# Patient Record
Sex: FEMALE | URBAN - METROPOLITAN AREA
[De-identification: names, ages, dates, MRNs, and addresses within clinical notes are randomized per-mention and may not be internally consistent; named-entity substitution may affect disease eponyms.]

---

## 2017-03-06 ENCOUNTER — APPOINTMENT (OUTPATIENT)
Age: 27
Setting detail: DERMATOLOGY
End: 2017-03-06

## 2017-03-06 VITALS — WEIGHT: 197 LBS | HEIGHT: 61 IN | WEIGHT: 197 LBS | HEIGHT: 61 IN

## 2017-03-06 DIAGNOSIS — L81.4 OTHER MELANIN HYPERPIGMENTATION: ICD-10-CM

## 2017-03-06 DIAGNOSIS — L21.8 OTHER SEBORRHEIC DERMATITIS: ICD-10-CM

## 2017-03-06 DIAGNOSIS — L70.0 ACNE VULGARIS: ICD-10-CM

## 2017-03-06 PROCEDURE — OTHER TREATMENT REGIMEN: OTHER

## 2017-03-06 PROCEDURE — OTHER PRESCRIPTION: OTHER

## 2017-03-06 PROCEDURE — 99202 OFFICE O/P NEW SF 15 MIN: CPT

## 2017-03-06 PROCEDURE — OTHER FOLLOW UP FOR NEXT VISIT: OTHER

## 2017-03-06 PROCEDURE — OTHER COUNSELING: OTHER

## 2017-03-06 RX ORDER — CLINDAMYCIN PHOSPHATE AND TRETINOIN 12; .25 MG/G; MG/G
THIN COAT GEL TOPICAL QHS
Qty: 1 | Refills: 2 | Status: ERX | COMMUNITY
Start: 2017-03-06

## 2017-03-06 ASSESSMENT — LOCATION DETAILED DESCRIPTION DERM
LOCATION DETAILED: INFERIOR MID FOREHEAD
LOCATION DETAILED: INFERIOR MID FOREHEAD
LOCATION DETAILED: RIGHT MEDIAL MALAR CHEEK
LOCATION DETAILED: RIGHT MEDIAL MALAR CHEEK
LOCATION DETAILED: LEFT INFERIOR CENTRAL MALAR CHEEK
LOCATION DETAILED: RIGHT SUPERIOR LATERAL MALAR CHEEK
LOCATION DETAILED: LEFT SUPERIOR CENTRAL MALAR CHEEK
LOCATION DETAILED: LEFT INFERIOR CENTRAL MALAR CHEEK
LOCATION DETAILED: RIGHT SUPERIOR LATERAL MALAR CHEEK
LOCATION DETAILED: LEFT SUPERIOR CENTRAL MALAR CHEEK

## 2017-03-06 ASSESSMENT — SEVERITY ASSESSMENT
SEVERITY: MILD
SEVERITY: MILD
HOW SEVERE IS THIS PATIENT'S CONDITION?: CLEAR
HOW SEVERE IS THIS PATIENT'S CONDITION?: CLEAR

## 2017-03-06 ASSESSMENT — LOCATION SIMPLE DESCRIPTION DERM
LOCATION SIMPLE: INFERIOR FOREHEAD
LOCATION SIMPLE: LEFT CHEEK
LOCATION SIMPLE: LEFT CHEEK
LOCATION SIMPLE: RIGHT CHEEK
LOCATION SIMPLE: RIGHT CHEEK
LOCATION SIMPLE: INFERIOR FOREHEAD

## 2017-03-06 ASSESSMENT — LOCATION ZONE DERM
LOCATION ZONE: FACE
LOCATION ZONE: FACE

## 2017-03-06 ASSESSMENT — SEVERITY ASSESSMENT OVERALL AMONG ALL PATIENTS
IN YOUR EXPERIENCE, AMONG ALL PATIENTS YOU HAVE SEEN WITH THIS CONDITION, HOW SEVERE IS THIS PATIENT'S CONDITION?: FEW INFLAMMATORY LESIONS, SOME NONINFLAMMATORY
IN YOUR EXPERIENCE, AMONG ALL PATIENTS YOU HAVE SEEN WITH THIS CONDITION, HOW SEVERE IS THIS PATIENT'S CONDITION?: FEW INFLAMMATORY LESIONS, SOME NONINFLAMMATORY

## 2017-03-06 NOTE — PROCEDURE: FOLLOW UP FOR NEXT VISIT
Scheduled For Follow Up In (Optional): 2 mos
Detail Level: Zone
Instructions (Optional): Call to check in on progress with patient

## 2017-03-06 NOTE — HPI: PIMPLES (NODULAR ACNE)
How Severe Is Your Nodular Acne?: moderate
Is This A New Presentation, Or A Follow-Up?: Acne
Additional History: Puala worked for pt in the past.
Females Only: When Was Your Last Menstrual Period?: [id number]

## 2017-03-06 NOTE — PROCEDURE: TREATMENT REGIMEN
Detail Level: Zone
Plan: Patient was using Ziana in the past but ran out of the medication. It was working well for her so we will restart the same medication. She may want to hold the benzoyl peroxide wash that she has been using in the morning due to the fact that this could cause increased dryness but she can always add it back as needed
Plan: The Ziana should make a big difference with this issue as well, it may take longer to see results but ongoing use will help
Plan: Currently there is no evidence of any scaling or dryness but she has been using ketoconazole daily for the last one year. She is going to try to back off on the frequency of the application to see if she still needs. If she does need a refill she will call for a prescription of the 2% cream
Detail Level: Simple

## 2017-04-13 ENCOUNTER — RX ONLY (RX ONLY)
Age: 27
End: 2017-04-13

## 2017-04-13 RX ORDER — TRETINOIN 0.5 MG/G
THIN COAT CREAM TOPICAL QHS
Qty: 1 | Refills: 1 | Status: ERX | COMMUNITY
Start: 2017-04-13

## 2018-09-28 ENCOUNTER — ACNE (OUTPATIENT)
Dept: URBAN - METROPOLITAN AREA CLINIC 18 | Facility: CLINIC | Age: 28
Setting detail: DERMATOLOGY
End: 2018-09-28

## 2018-09-28 ENCOUNTER — RX ONLY (RX ONLY)
Age: 28
End: 2018-09-28

## 2018-09-28 DIAGNOSIS — L81.4 OTHER MELANIN HYPERPIGMENTATION: ICD-10-CM

## 2018-09-28 DIAGNOSIS — L82.1 OTHER SEBORRHEIC KERATOSIS: ICD-10-CM

## 2018-09-28 DIAGNOSIS — L85.9 EPIDERMAL THICKENING, UNSPECIFIED: ICD-10-CM

## 2018-09-28 DIAGNOSIS — D22.5 MELANOCYTIC NEVI OF TRUNK: ICD-10-CM

## 2018-09-28 DIAGNOSIS — L57.8 OTHER SKIN CHANGES DUE TO CHRONIC EXPOSURE TO NONIONIZING RADIATION: ICD-10-CM

## 2018-09-28 DIAGNOSIS — D18.01 HEMANGIOMA OF SKIN AND SUBCUTANEOUS TISSUE: ICD-10-CM

## 2018-09-28 PROCEDURE — 99202 OFFICE O/P NEW SF 15 MIN: CPT

## 2018-09-28 RX ORDER — TRETINOIN 0.6 MG/G
1 APPLICATION GEL TOPICAL NIGHTLY
Qty: 50 | Refills: 2
Start: 2018-09-28

## 2018-09-28 RX ORDER — DAPSONE 75 MG/G
1 APPLICATION GEL TOPICAL IN A.M.
Qty: 1 | Refills: 2
Start: 2018-09-28

## 2021-03-15 ENCOUNTER — ACNE (OUTPATIENT)
Dept: URBAN - METROPOLITAN AREA CLINIC 17 | Facility: CLINIC | Age: 31
Setting detail: DERMATOLOGY
End: 2021-03-15

## 2021-03-15 ENCOUNTER — RX ONLY (RX ONLY)
Age: 31
End: 2021-03-15

## 2021-03-15 DIAGNOSIS — L73.9 FOLLICULAR DISORDER, UNSPECIFIED: ICD-10-CM

## 2021-03-15 PROCEDURE — 99214 OFFICE O/P EST MOD 30 MIN: CPT

## 2021-03-15 RX ORDER — TRETINOIN 0.5 MG/G
A SMALL AMOUNT CREAM TOPICAL EVERY EVENING
Qty: 45 | Refills: 3
Start: 2021-03-15

## 2022-05-31 ENCOUNTER — APPOINTMENT (OUTPATIENT)
Dept: URBAN - METROPOLITAN AREA CLINIC 270 | Age: 32
Setting detail: DERMATOLOGY
End: 2022-06-03

## 2022-05-31 DIAGNOSIS — L73.2 HIDRADENITIS SUPPURATIVA: ICD-10-CM

## 2022-05-31 PROCEDURE — OTHER COUNSELING: OTHER

## 2022-05-31 PROCEDURE — OTHER MEDICATION COUNSELING: OTHER

## 2022-05-31 PROCEDURE — OTHER MIPS QUALITY: OTHER

## 2022-05-31 PROCEDURE — OTHER PRESCRIPTION: OTHER

## 2022-05-31 PROCEDURE — 99203 OFFICE O/P NEW LOW 30 MIN: CPT

## 2022-05-31 PROCEDURE — OTHER REASSURANCE: OTHER

## 2022-05-31 RX ORDER — CLINDAMYCIN PHOSPHATE 10 MG/ML
SOLUTION TOPICAL
Qty: 1 | Refills: 2 | Status: ERX | COMMUNITY
Start: 2022-05-31

## 2022-05-31 RX ORDER — CHLORHEXIDINE GLUCONATE 213 G/1000ML
SOLUTION TOPICAL
Qty: 3785.41 | Refills: 2 | Status: ERX | COMMUNITY
Start: 2022-05-31

## 2022-05-31 RX ORDER — DOXYCYCLINE HYCLATE 100 MG/1
CAPSULE, GELATIN COATED ORAL
Qty: 30 | Refills: 2 | Status: ERX | COMMUNITY
Start: 2022-05-31

## 2022-05-31 ASSESSMENT — LOCATION DETAILED DESCRIPTION DERM
LOCATION DETAILED: XIPHOID
LOCATION DETAILED: RIGHT AXILLARY VAULT
LOCATION DETAILED: LEFT AXILLARY VAULT

## 2022-05-31 ASSESSMENT — LOCATION ZONE DERM
LOCATION ZONE: TRUNK
LOCATION ZONE: AXILLAE

## 2022-05-31 ASSESSMENT — LOCATION SIMPLE DESCRIPTION DERM
LOCATION SIMPLE: ABDOMEN
LOCATION SIMPLE: RIGHT AXILLARY VAULT
LOCATION SIMPLE: LEFT AXILLARY VAULT

## 2022-05-31 NOTE — PROCEDURE: MEDICATION COUNSELING
99 Simponi Counseling:  I discussed with the patient the risks of golimumab including but not limited to myelosuppression, immunosuppression, autoimmune hepatitis, demyelinating diseases, lymphoma, and serious infections.  The patient understands that monitoring is required including a PPD at baseline and must alert us or the primary physician if symptoms of infection or other concerning signs are noted.

## 2022-09-16 ENCOUNTER — APPOINTMENT (OUTPATIENT)
Dept: URBAN - METROPOLITAN AREA CLINIC 270 | Age: 32
Setting detail: DERMATOLOGY
End: 2022-09-20

## 2022-09-16 DIAGNOSIS — L29.8 OTHER PRURITUS: ICD-10-CM

## 2022-09-16 DIAGNOSIS — L73.2 HIDRADENITIS SUPPURATIVA: ICD-10-CM

## 2022-09-16 PROCEDURE — OTHER ADDITIONAL NOTES: OTHER

## 2022-09-16 PROCEDURE — OTHER COUNSELING: OTHER

## 2022-09-16 PROCEDURE — OTHER INTRALESIONAL KENALOG: OTHER

## 2022-09-16 PROCEDURE — 99213 OFFICE O/P EST LOW 20 MIN: CPT | Mod: 25

## 2022-09-16 PROCEDURE — OTHER SEPARATE AND IDENTIFIABLE DOCUMENTATION: OTHER

## 2022-09-16 PROCEDURE — OTHER MIPS QUALITY: OTHER

## 2022-09-16 PROCEDURE — OTHER MEDICATION COUNSELING: OTHER

## 2022-09-16 PROCEDURE — 11900 INJECT SKIN LESIONS </W 7: CPT

## 2022-09-16 PROCEDURE — OTHER REASSURANCE: OTHER

## 2022-09-16 ASSESSMENT — LOCATION DETAILED DESCRIPTION DERM
LOCATION DETAILED: LEFT RIB CAGE
LOCATION DETAILED: RIGHT AXILLARY VAULT
LOCATION DETAILED: XIPHOID
LOCATION DETAILED: LEFT AXILLARY VAULT
LOCATION DETAILED: LEFT AXILLARY TAIL OF BREAST

## 2022-09-16 ASSESSMENT — LOCATION ZONE DERM
LOCATION ZONE: TRUNK
LOCATION ZONE: AXILLAE

## 2022-09-16 ASSESSMENT — LOCATION SIMPLE DESCRIPTION DERM
LOCATION SIMPLE: LEFT AXILLARY VAULT
LOCATION SIMPLE: LEFT BREAST
LOCATION SIMPLE: ABDOMEN
LOCATION SIMPLE: RIGHT AXILLARY VAULT

## 2022-09-16 ASSESSMENT — HURLEY STAGE
IN YOUR EXPERIENCE, AMONG ALL PATIENTS YOU HAVE SEEN WITH THIS CONDITION, HOW SEVERE IS THIS PATIENT'S CONDITION?: HURLEY STAGE I: ABSCESS FORMATION (SINGLE OR MULTIPLE) WITHOUT SINUS TRACTS OR SCARRING

## 2022-09-16 NOTE — PROCEDURE: MEDICATION COUNSELING
[No Acute Distress] : no acute distress [Well Nourished] : well nourished Valtrex Pregnancy And Lactation Text: this medication is Pregnancy Category B and is considered safe during pregnancy. This medication is not directly found in breast milk but it's metabolite acyclovir is present. [Well Developed] : well developed [Well-Appearing] : well-appearing [Alert and Oriented x3] : oriented to person, place, and time [Normal Appearance] : was normal in appearance [Neck Supple] : was supple [No Respiratory Distress] : no respiratory distress  [Clear to Auscultation] : lungs were clear to auscultation bilaterally [Normal Rhythm/Effort] : normal respiratory rhythm and effort [Normal Rate] : normal rate  [Regular Rhythm] : with a regular rhythm [Normal S1, S2] : normal S1 and S2 [No Murmur] : no murmur heard [No Edema] : there was no peripheral edema [Soft] : abdomen soft [Non Tender] : non-tender [de-identified] : bite shaina abdomen with slight redness, no surrounding rash

## 2022-09-16 NOTE — PROCEDURE: MEDICATION COUNSELING
Prior to immunization administration, verified patients identity using patient s name and date of birth. Please see Immunization Activity for additional information.     Screening Questionnaire for Pediatric Immunization    Is the child sick today?   No   Does the child have allergies to medications, food, a vaccine component, or latex?   No   Has the child had a serious reaction to a vaccine in the past?   No   Does the child have a long-term health problem with lung, heart, kidney or metabolic disease (e.g., diabetes), asthma, a blood disorder, no spleen, complement component deficiency, a cochlear implant, or a spinal fluid leak?  Is he/she on long-term aspirin therapy?   No   If the child to be vaccinated is 2 through 4 years of age, has a healthcare provider told you that the child had wheezing or asthma in the  past 12 months?   No   If your child is a baby, have you ever been told he or she has had intussusception?   No   Has the child, sibling or parent had a seizure, has the child had brain or other nervous system problems?   No   Does the child have cancer, leukemia, AIDS, or any immune system         problem?   No   Does the child have a parent, brother, or sister with an immune system problem?   No   In the past 3 months, has the child taken medications that affect the immune system such as prednisone, other steroids, or anticancer drugs; drugs for the treatment of rheumatoid arthritis, Crohn s disease, or psoriasis; or had radiation treatments?   Yes   In the past year, has the child received a transfusion of blood or blood products, or been given immune (gamma) globulin or an antiviral drug?   No   Is the child/teen pregnant or is there a chance that she could become       pregnant during the next month?   No   Has the child received any vaccinations in the past 4 weeks?   No      Immunization questionnaire answers were all negative.          Per orders of Dr. Stoll, injection of Dtap-IPV and Influeza  given by Lola Chou MA. Patient instructed to remain in clinic for 15 minutes afterwards, and to report any adverse reaction to me immediately.    Screening performed by Lola Chou MA on 9/7/2022 at 9:18 AM.   Enbrel Counseling:  I discussed with the patient the risks of etanercept including but not limited to myelosuppression, immunosuppression, autoimmune hepatitis, demyelinating diseases, lymphoma, and infections.  The patient understands that monitoring is required including a PPD at baseline and must alert us or the primary physician if symptoms of infection or other concerning signs are noted.

## 2022-09-16 NOTE — PROCEDURE: ADDITIONAL NOTES
Patient Management Risk Assessment: Moderate
Render Risk Assessment In Note?: yes
Additional Notes: Spoke about potentially starting humira if not improved. \\nRecommended Laser hair removal. \\nR axilla improved today.
Detail Level: Simple

## 2022-09-16 NOTE — PROCEDURE: MEDICATION COUNSELING
Cyclosporine Pregnancy And Lactation Text: This medication is Pregnancy Category C and it isn't know if it is safe during pregnancy. This medication is excreted in breast milk. Normal rate, regular rhythm.  Heart sounds S1, S2.  No murmurs, rubs or gallops.

## 2022-10-14 ENCOUNTER — APPOINTMENT (OUTPATIENT)
Dept: URBAN - METROPOLITAN AREA CLINIC 270 | Age: 32
Setting detail: DERMATOLOGY
End: 2022-10-19

## 2022-10-14 ENCOUNTER — RX ONLY (RX ONLY)
Age: 32
End: 2022-10-14

## 2022-10-14 DIAGNOSIS — L29.8 OTHER PRURITUS: ICD-10-CM

## 2022-10-14 DIAGNOSIS — L73.2 HIDRADENITIS SUPPURATIVA: ICD-10-CM

## 2022-10-14 DIAGNOSIS — L70.0 ACNE VULGARIS: ICD-10-CM

## 2022-10-14 PROCEDURE — OTHER ADDITIONAL NOTES: OTHER

## 2022-10-14 PROCEDURE — OTHER COUNSELING: OTHER

## 2022-10-14 PROCEDURE — OTHER INTRALESIONAL KENALOG: OTHER

## 2022-10-14 PROCEDURE — OTHER SEPARATE AND IDENTIFIABLE DOCUMENTATION: OTHER

## 2022-10-14 PROCEDURE — OTHER MEDICATION COUNSELING: OTHER

## 2022-10-14 PROCEDURE — 11900 INJECT SKIN LESIONS </W 7: CPT

## 2022-10-14 PROCEDURE — OTHER PRESCRIPTION: OTHER

## 2022-10-14 PROCEDURE — OTHER REASSURANCE: OTHER

## 2022-10-14 PROCEDURE — 99214 OFFICE O/P EST MOD 30 MIN: CPT | Mod: 25

## 2022-10-14 RX ORDER — TRETIONIN 0.5 MG/G
CREAM TOPICAL
Qty: 45 | Refills: 1 | Status: ERX

## 2022-10-14 RX ORDER — TRETIONIN 0.5 MG/G
CREAM TOPICAL
Qty: 45 | Refills: 1 | Status: ERX | COMMUNITY
Start: 2022-10-14

## 2022-10-14 RX ORDER — CLINDAMYCIN PHOSPHATE 10 MG/ML
SOLUTION TOPICAL
Qty: 120 | Refills: 4 | Status: ERX

## 2022-10-14 RX ORDER — CLINDAMYCIN PHOSPHATE 10 MG/ML
SOLUTION TOPICAL
Qty: 120 | Refills: 4 | Status: ERX | COMMUNITY
Start: 2022-10-14

## 2022-10-14 ASSESSMENT — LOCATION DETAILED DESCRIPTION DERM
LOCATION DETAILED: LEFT AXILLARY VAULT
LOCATION DETAILED: RIGHT INFERIOR CENTRAL MALAR CHEEK
LOCATION DETAILED: LEFT INFERIOR CENTRAL MALAR CHEEK
LOCATION DETAILED: RIGHT AXILLARY VAULT
LOCATION DETAILED: LEFT RIB CAGE
LOCATION DETAILED: XIPHOID
LOCATION DETAILED: LEFT AXILLARY TAIL OF BREAST
LOCATION DETAILED: INFERIOR MID FOREHEAD

## 2022-10-14 ASSESSMENT — LOCATION SIMPLE DESCRIPTION DERM
LOCATION SIMPLE: RIGHT AXILLARY VAULT
LOCATION SIMPLE: LEFT AXILLARY VAULT
LOCATION SIMPLE: LEFT BREAST
LOCATION SIMPLE: INFERIOR FOREHEAD
LOCATION SIMPLE: RIGHT CHEEK
LOCATION SIMPLE: ABDOMEN
LOCATION SIMPLE: LEFT CHEEK

## 2022-10-14 ASSESSMENT — LOCATION ZONE DERM
LOCATION ZONE: FACE
LOCATION ZONE: AXILLAE
LOCATION ZONE: TRUNK

## 2022-10-14 NOTE — PROCEDURE: ADDITIONAL NOTES
Detail Level: Simple
Render Risk Assessment In Note?: yes
Additional Notes: Spoke about potentially starting humira if not improved. \\nRecommended Laser hair removal. \\nR axilla improved today.
Patient Management Risk Assessment: Moderate

## 2022-10-14 NOTE — PROCEDURE: COUNSELING
Doxycycline Pregnancy And Lactation Text: This medication is Pregnancy Category D and not consider safe during pregnancy. It is also excreted in breast milk but is considered safe for shorter treatment courses.
High Dose Vitamin A Pregnancy And Lactation Text: High dose vitamin A therapy is contraindicated during pregnancy and breast feeding.
Topical Retinoid counseling:  Patient advised to apply a pea-sized amount only at bedtime and wait 30 minutes after washing their face before applying.  If too drying, patient may add a non-comedogenic moisturizer. The patient verbalized understanding of the proper use and possible adverse effects of retinoids.  All of the patient's questions and concerns were addressed.
Bactrim Pregnancy And Lactation Text: This medication is Pregnancy Category D and is known to cause fetal risk.  It is also excreted in breast milk.
Detail Level: Detailed
Aklief counseling:  Patient advised to apply a pea-sized amount only at bedtime and wait 30 minutes after washing their face before applying.  If too drying, patient may add a non-comedogenic moisturizer.  The most commonly reported side effects including irritation, redness, scaling, dryness, stinging, burning, itching, and increased risk of sunburn.  The patient verbalized understanding of the proper use and possible adverse effects of retinoids.  All of the patient's questions and concerns were addressed.
Topical Sulfur Applications Counseling: Topical Sulfur Counseling: Patient counseled that this medication may cause skin irritation or allergic reactions.  In the event of skin irritation, the patient was advised to reduce the amount of the drug applied or use it less frequently.   The patient verbalized understanding of the proper use and possible adverse effects of topical sulfur application.  All of the patient's questions and concerns were addressed.
Isotretinoin Pregnancy And Lactation Text: This medication is Pregnancy Category X and is considered extremely dangerous during pregnancy. It is unknown if it is excreted in breast milk.
Include Pregnancy/Lactation Warning?: No
Azithromycin Counseling:  I discussed with the patient the risks of azithromycin including but not limited to GI upset, allergic reaction, drug rash, diarrhea, and yeast infections.
Winlevi Counseling:  I discussed with the patient the risks of topical clascoterone including but not limited to erythema, scaling, itching, and stinging. Patient voiced their understanding.
Dapsone Pregnancy And Lactation Text: This medication is Pregnancy Category C and is not considered safe during pregnancy or breast feeding.
Sarecycline Pregnancy And Lactation Text: This medication is Pregnancy Category D and not consider safe during pregnancy. It is also excreted in breast milk.
Benzoyl Peroxide Pregnancy And Lactation Text: This medication is Pregnancy Category C. It is unknown if benzoyl peroxide is excreted in breast milk.
Isotretinoin Counseling: Patient should get monthly blood tests, not donate blood, not drive at night if vision affected, not share medication, and not undergo elective surgery for 6 months after tx completed. Side effects reviewed, pt to contact office should one occur.
High Dose Vitamin A Counseling: Side effects reviewed, pt to contact office should one occur.
Erythromycin Counseling:  I discussed with the patient the risks of erythromycin including but not limited to GI upset, allergic reaction, drug rash, diarrhea, increase in liver enzymes, and yeast infections.
Tazorac Pregnancy And Lactation Text: This medication is not safe during pregnancy. It is unknown if this medication is excreted in breast milk.
Benzoyl Peroxide Counseling: Patient counseled that medicine may cause skin irritation and bleach clothing.  In the event of skin irritation, the patient was advised to reduce the amount of the drug applied or use it less frequently.   The patient verbalized understanding of the proper use and possible adverse effects of benzoyl peroxide.  All of the patient's questions and concerns were addressed.
Spironolactone Pregnancy And Lactation Text: This medication can cause feminization of the male fetus and should be avoided during pregnancy. The active metabolite is also found in breast milk.
Topical Retinoid Pregnancy And Lactation Text: This medication is Pregnancy Category C. It is unknown if this medication is excreted in breast milk.
Birth Control Pills Counseling: Birth Control Pill Counseling: I discussed with the patient the potential side effects of OCPs including but not limited to increased risk of stroke, heart attack, thrombophlebitis, deep venous thrombosis, hepatic adenomas, breast changes, GI upset, headaches, and depression.  The patient verbalized understanding of the proper use and possible adverse effects of OCPs. All of the patient's questions and concerns were addressed.
Bactrim Counseling:  I discussed with the patient the risks of sulfa antibiotics including but not limited to GI upset, allergic reaction, drug rash, diarrhea, dizziness, photosensitivity, and yeast infections.  Rarely, more serious reactions can occur including but not limited to aplastic anemia, agranulocytosis, methemoglobinemia, blood dyscrasias, liver or kidney failure, lung infiltrates or desquamative/blistering drug rashes.
Topical Sulfur Applications Pregnancy And Lactation Text: This medication is Pregnancy Category C and has an unknown safety profile during pregnancy. It is unknown if this topical medication is excreted in breast milk.
Topical Clindamycin Pregnancy And Lactation Text: This medication is Pregnancy Category B and is considered safe during pregnancy. It is unknown if it is excreted in breast milk.
Minocycline Counseling: Patient advised regarding possible photosensitivity and discoloration of the teeth, skin, lips, tongue and gums.  Patient instructed to avoid sunlight, if possible.  When exposed to sunlight, patients should wear protective clothing, sunglasses, and sunscreen.  The patient was instructed to call the office immediately if the following severe adverse effects occur:  hearing changes, easy bruising/bleeding, severe headache, or vision changes.  The patient verbalized understanding of the proper use and possible adverse effects of minocycline.  All of the patient's questions and concerns were addressed.
Winlevi Pregnancy And Lactation Text: This medication is considered safe during pregnancy and breastfeeding.
Dapsone Counseling: I discussed with the patient the risks of dapsone including but not limited to hemolytic anemia, agranulocytosis, rashes, methemoglobinemia, kidney failure, peripheral neuropathy, headaches, GI upset, and liver toxicity.  Patients who start dapsone require monitoring including baseline LFTs and weekly CBCs for the first month, then every month thereafter.  The patient verbalized understanding of the proper use and possible adverse effects of dapsone.  All of the patient's questions and concerns were addressed.
Sarecycline Counseling: Patient advised regarding possible photosensitivity and discoloration of the teeth, skin, lips, tongue and gums.  Patient instructed to avoid sunlight, if possible.  When exposed to sunlight, patients should wear protective clothing, sunglasses, and sunscreen.  The patient was instructed to call the office immediately if the following severe adverse effects occur:  hearing changes, easy bruising/bleeding, severe headache, or vision changes.  The patient verbalized understanding of the proper use and possible adverse effects of sarecycline.  All of the patient's questions and concerns were addressed.
Tetracycline Counseling: Patient counseled regarding possible photosensitivity and increased risk for sunburn.  Patient instructed to avoid sunlight, if possible.  When exposed to sunlight, patients should wear protective clothing, sunglasses, and sunscreen.  The patient was instructed to call the office immediately if the following severe adverse effects occur:  hearing changes, easy bruising/bleeding, severe headache, or vision changes.  The patient verbalized understanding of the proper use and possible adverse effects of tetracycline.  All of the patient's questions and concerns were addressed. Patient understands to avoid pregnancy while on therapy due to potential birth defects.
Topical Clindamycin Counseling: Patient counseled that this medication may cause skin irritation or allergic reactions.  In the event of skin irritation, the patient was advised to reduce the amount of the drug applied or use it less frequently.   The patient verbalized understanding of the proper use and possible adverse effects of clindamycin.  All of the patient's questions and concerns were addressed.
Azelaic Acid Pregnancy And Lactation Text: This medication is considered safe during pregnancy and breast feeding.
Spironolactone Counseling: Patient advised regarding risks of diarrhea, abdominal pain, hyperkalemia, birth defects (for female patients), liver toxicity and renal toxicity. The patient may need blood work to monitor liver and kidney function and potassium levels while on therapy. The patient verbalized understanding of the proper use and possible adverse effects of spironolactone.  All of the patient's questions and concerns were addressed.
Tazorac Counseling:  Patient advised that medication is irritating and drying.  Patient may need to apply sparingly and wash off after an hour before eventually leaving it on overnight.  The patient verbalized understanding of the proper use and possible adverse effects of tazorac.  All of the patient's questions and concerns were addressed.
Aklief Pregnancy And Lactation Text: It is unknown if this medication is safe to use during pregnancy.  It is unknown if this medication is excreted in breast milk.  Breastfeeding women should use the topical cream on the smallest area of the skin for the shortest time needed while breastfeeding.  Do not apply to nipple and areola.
Birth Control Pills Pregnancy And Lactation Text: This medication should be avoided if pregnant and for the first 30 days post-partum.
Doxycycline Counseling:  Patient counseled regarding possible photosensitivity and increased risk for sunburn.  Patient instructed to avoid sunlight, if possible.  When exposed to sunlight, patients should wear protective clothing, sunglasses, and sunscreen.  The patient was instructed to call the office immediately if the following severe adverse effects occur:  hearing changes, easy bruising/bleeding, severe headache, or vision changes.  The patient verbalized understanding of the proper use and possible adverse effects of doxycycline.  All of the patient's questions and concerns were addressed.
Erythromycin Pregnancy And Lactation Text: This medication is Pregnancy Category B and is considered safe during pregnancy. It is also excreted in breast milk.
Azelaic Acid Counseling: Patient counseled that medicine may cause skin irritation and to avoid applying near the eyes.  In the event of skin irritation, the patient was advised to reduce the amount of the drug applied or use it less frequently.   The patient verbalized understanding of the proper use and possible adverse effects of azelaic acid.  All of the patient's questions and concerns were addressed.
Azithromycin Pregnancy And Lactation Text: This medication is considered safe during pregnancy and is also secreted in breast milk.

## 2022-11-23 ENCOUNTER — APPOINTMENT (OUTPATIENT)
Dept: URBAN - METROPOLITAN AREA CLINIC 270 | Age: 32
Setting detail: DERMATOLOGY
End: 2022-11-24

## 2022-11-23 DIAGNOSIS — L29.8 OTHER PRURITUS: ICD-10-CM

## 2022-11-23 DIAGNOSIS — L73.2 HIDRADENITIS SUPPURATIVA: ICD-10-CM

## 2022-11-23 PROCEDURE — OTHER COUNSELING: OTHER

## 2022-11-23 PROCEDURE — OTHER REASSURANCE: OTHER

## 2022-11-23 PROCEDURE — OTHER INTRALESIONAL KENALOG: OTHER

## 2022-11-23 PROCEDURE — OTHER ADDITIONAL NOTES: OTHER

## 2022-11-23 PROCEDURE — OTHER MEDICATION COUNSELING: OTHER

## 2022-11-23 PROCEDURE — OTHER SEPARATE AND IDENTIFIABLE DOCUMENTATION: OTHER

## 2022-11-23 PROCEDURE — 11900 INJECT SKIN LESIONS </W 7: CPT

## 2022-11-23 PROCEDURE — OTHER PRESCRIPTION: OTHER

## 2022-11-23 PROCEDURE — 99213 OFFICE O/P EST LOW 20 MIN: CPT | Mod: 25

## 2022-11-23 RX ORDER — DOXYCYCLINE HYCLATE 100 MG/1
CAPSULE, GELATIN COATED ORAL
Qty: 30 | Refills: 3 | Status: ERX

## 2022-11-23 ASSESSMENT — LOCATION SIMPLE DESCRIPTION DERM
LOCATION SIMPLE: LEFT BREAST
LOCATION SIMPLE: ABDOMEN
LOCATION SIMPLE: LEFT AXILLARY VAULT

## 2022-11-23 ASSESSMENT — LOCATION ZONE DERM
LOCATION ZONE: AXILLAE
LOCATION ZONE: TRUNK

## 2022-11-23 ASSESSMENT — LOCATION DETAILED DESCRIPTION DERM
LOCATION DETAILED: LEFT RIB CAGE
LOCATION DETAILED: LEFT AXILLARY VAULT
LOCATION DETAILED: LEFT AXILLARY TAIL OF BREAST

## 2022-11-23 NOTE — PROCEDURE: MEDICATION COUNSELING
Continue: prednisolone acetate (prednisolone acetate): drops,suspension: 1% 1 drop three times a day as directed into left eye 06- Imiquimod Counseling:  I discussed with the patient the risks of imiquimod including but not limited to erythema, scaling, itching, weeping, crusting, and pain.  Patient understands that the inflammatory response to imiquimod is variable from person to person and was educated regarded proper titration schedule.  If flu-like symptoms develop, patient knows to discontinue the medication and contact us.

## 2022-11-23 NOTE — PROCEDURE: ADDITIONAL NOTES
Patient Management Risk Assessment: Moderate
Additional Notes: Spoke about potentially starting humira if not improved. \\nRecommended Laser hair removal. \\nR axilla improved today.
Detail Level: Simple
Render Risk Assessment In Note?: yes

## 2022-12-20 ENCOUNTER — APPOINTMENT (OUTPATIENT)
Dept: URBAN - METROPOLITAN AREA CLINIC 270 | Age: 32
Setting detail: DERMATOLOGY
End: 2022-12-20

## 2022-12-20 DIAGNOSIS — L73.2 HIDRADENITIS SUPPURATIVA: ICD-10-CM

## 2022-12-20 DIAGNOSIS — L29.8 OTHER PRURITUS: ICD-10-CM

## 2022-12-20 PROCEDURE — OTHER MEDICATION COUNSELING: OTHER

## 2022-12-20 PROCEDURE — OTHER SEPARATE AND IDENTIFIABLE DOCUMENTATION: OTHER

## 2022-12-20 PROCEDURE — OTHER REASSURANCE: OTHER

## 2022-12-20 PROCEDURE — 11900 INJECT SKIN LESIONS </W 7: CPT

## 2022-12-20 PROCEDURE — 99213 OFFICE O/P EST LOW 20 MIN: CPT | Mod: 25

## 2022-12-20 PROCEDURE — OTHER INTRALESIONAL KENALOG: OTHER

## 2022-12-20 PROCEDURE — OTHER ADDITIONAL NOTES: OTHER

## 2022-12-20 PROCEDURE — OTHER COUNSELING: OTHER

## 2022-12-20 PROCEDURE — OTHER PRESCRIPTION MEDICATION MANAGEMENT: OTHER

## 2022-12-20 ASSESSMENT — LOCATION DETAILED DESCRIPTION DERM
LOCATION DETAILED: LEFT AXILLARY VAULT
LOCATION DETAILED: LEFT POSTERIOR AXILLA

## 2022-12-20 ASSESSMENT — LOCATION SIMPLE DESCRIPTION DERM
LOCATION SIMPLE: LEFT AXILLARY VAULT
LOCATION SIMPLE: LEFT POSTERIOR AXILLA

## 2022-12-20 ASSESSMENT — LOCATION ZONE DERM: LOCATION ZONE: AXILLAE

## 2022-12-20 NOTE — PROCEDURE: MEDICATION COUNSELING
walking/standing Humira Counseling:  I discussed with the patient the risks of adalimumab including but not limited to myelosuppression, immunosuppression, autoimmune hepatitis, demyelinating diseases, lymphoma, and serious infections.  The patient understands that monitoring is required including a PPD at baseline and must alert us or the primary physician if symptoms of infection or other concerning signs are noted.

## 2022-12-20 NOTE — PROCEDURE: PRESCRIPTION MEDICATION MANAGEMENT
Render In Strict Bullet Format?: No
Detail Level: Zone
Continue Regimen: doxycycline hyclate 100 mg capsule

## 2022-12-20 NOTE — PROCEDURE: ADDITIONAL NOTES
Detail Level: Simple
Patient Management Risk Assessment: Moderate
Render Risk Assessment In Note?: yes
Additional Notes: Spoke about potentially starting humira if not improved. \\nRecommended Laser hair removal. \\nR axilla improved today.
Additional Notes: Will call if refills are needed

## 2023-01-17 ENCOUNTER — APPOINTMENT (OUTPATIENT)
Dept: URBAN - METROPOLITAN AREA CLINIC 270 | Age: 33
Setting detail: DERMATOLOGY
End: 2023-01-17

## 2023-01-17 DIAGNOSIS — L29.8 OTHER PRURITUS: ICD-10-CM

## 2023-01-17 DIAGNOSIS — L73.2 HIDRADENITIS SUPPURATIVA: ICD-10-CM

## 2023-01-17 PROCEDURE — 99213 OFFICE O/P EST LOW 20 MIN: CPT | Mod: 25

## 2023-01-17 PROCEDURE — OTHER MEDICATION COUNSELING: OTHER

## 2023-01-17 PROCEDURE — OTHER ADDITIONAL NOTES: OTHER

## 2023-01-17 PROCEDURE — OTHER SEPARATE AND IDENTIFIABLE DOCUMENTATION: OTHER

## 2023-01-17 PROCEDURE — OTHER PRESCRIPTION MEDICATION MANAGEMENT: OTHER

## 2023-01-17 PROCEDURE — 11900 INJECT SKIN LESIONS </W 7: CPT

## 2023-01-17 PROCEDURE — OTHER COUNSELING: OTHER

## 2023-01-17 PROCEDURE — OTHER INTRALESIONAL KENALOG: OTHER

## 2023-01-17 ASSESSMENT — LOCATION ZONE DERM: LOCATION ZONE: AXILLAE

## 2023-01-17 ASSESSMENT — LOCATION DETAILED DESCRIPTION DERM
LOCATION DETAILED: LEFT POSTERIOR AXILLA
LOCATION DETAILED: LEFT AXILLARY VAULT

## 2023-01-17 ASSESSMENT — LOCATION SIMPLE DESCRIPTION DERM
LOCATION SIMPLE: LEFT AXILLARY VAULT
LOCATION SIMPLE: LEFT POSTERIOR AXILLA

## 2023-01-17 NOTE — PROCEDURE: ADDITIONAL NOTES
Additional Notes: Referring today for consultation with Dr. Eduardo Valentine in [place] for sinus tract removal. Additional Notes: Referring today for consultation with Dr. Eduardo Valentine in [place] for sinus tract removal.

## 2023-01-17 NOTE — PROCEDURE: MEDICATION COUNSELING
regular Zyclara Pregnancy And Lactation Text: This medication is Pregnancy Category C. It is unknown if this medication is excreted in breast milk.

## 2023-02-28 ENCOUNTER — APPOINTMENT (OUTPATIENT)
Dept: URBAN - METROPOLITAN AREA CLINIC 270 | Age: 33
Setting detail: DERMATOLOGY
End: 2023-03-02

## 2023-02-28 DIAGNOSIS — L73.2 HIDRADENITIS SUPPURATIVA: ICD-10-CM

## 2023-02-28 PROCEDURE — OTHER PRESCRIPTION MEDICATION MANAGEMENT: OTHER

## 2023-02-28 PROCEDURE — 11900 INJECT SKIN LESIONS </W 7: CPT

## 2023-02-28 PROCEDURE — OTHER ADDITIONAL NOTES: OTHER

## 2023-02-28 PROCEDURE — OTHER COUNSELING: OTHER

## 2023-02-28 PROCEDURE — OTHER INTRALESIONAL KENALOG: OTHER

## 2023-02-28 ASSESSMENT — LOCATION ZONE DERM
LOCATION ZONE: AXILLAE
LOCATION ZONE: TRUNK

## 2023-02-28 ASSESSMENT — LOCATION DETAILED DESCRIPTION DERM
LOCATION DETAILED: LEFT AXILLARY VAULT
LOCATION DETAILED: LEFT MEDIAL SUPERIOR CHEST

## 2023-02-28 ASSESSMENT — LOCATION SIMPLE DESCRIPTION DERM
LOCATION SIMPLE: CHEST
LOCATION SIMPLE: LEFT AXILLARY VAULT

## 2023-02-28 NOTE — PROCEDURE: COUNSELING
Patient Specific Counseling (Will Not Stick From Patient To Patient): Discussed other medications that patient could consider in future if worsening, but she feels overall well controlled with current regimen.
Detail Level: Detailed

## 2023-02-28 NOTE — PROCEDURE: ADDITIONAL NOTES
Additional Notes: Pt plans to soon follow up with Dr. Eduardo Valentine in [place] for sinus tract removal. Additional Notes: Pt plans to soon follow up with Dr. Eduardo Valentine in [place] for sinus tract removal.

## 2023-02-28 NOTE — PROCEDURE: PRESCRIPTION MEDICATION MANAGEMENT
Continue Regimen: doxycycline hyclate 100 mg capsule
Render In Strict Bullet Format?: No
Detail Level: Zone

## 2023-05-10 ENCOUNTER — RX ONLY (RX ONLY)
Age: 33
End: 2023-05-10

## 2023-05-10 ENCOUNTER — APPOINTMENT (OUTPATIENT)
Dept: URBAN - METROPOLITAN AREA CLINIC 270 | Age: 33
Setting detail: DERMATOLOGY
End: 2023-05-11

## 2023-05-10 DIAGNOSIS — L73.2 HIDRADENITIS SUPPURATIVA: ICD-10-CM

## 2023-05-10 DIAGNOSIS — L29.8 OTHER PRURITUS: ICD-10-CM

## 2023-05-10 PROCEDURE — OTHER INTRALESIONAL KENALOG: OTHER

## 2023-05-10 PROCEDURE — OTHER COUNSELING: OTHER

## 2023-05-10 PROCEDURE — OTHER ADDITIONAL NOTES: OTHER

## 2023-05-10 PROCEDURE — OTHER BLEACH BATH INSTRUCTIONS: OTHER

## 2023-05-10 PROCEDURE — OTHER PRESCRIPTION MEDICATION MANAGEMENT: OTHER

## 2023-05-10 PROCEDURE — 99213 OFFICE O/P EST LOW 20 MIN: CPT | Mod: 25

## 2023-05-10 PROCEDURE — 11900 INJECT SKIN LESIONS </W 7: CPT

## 2023-05-10 RX ORDER — CHLORHEXIDINE GLUCONATE 213 G/1000ML
SOLUTION TOPICAL
Qty: 3785.41 | Refills: 2 | Status: ERX

## 2023-05-10 ASSESSMENT — HURLEY STAGE
IN YOUR EXPERIENCE, AMONG ALL PATIENTS YOU HAVE SEEN WITH THIS CONDITION, HOW SEVERE IS THIS PATIENT'S CONDITION?: HURLEY STAGE II: SINGLE OR MULTIPLE, WIDELY SEPARATED RECURRENT ABSCESSES WITH SINUS TRACT FORMATION AND SCARRING

## 2023-05-10 ASSESSMENT — LOCATION ZONE DERM: LOCATION ZONE: AXILLAE

## 2023-05-10 ASSESSMENT — LOCATION DETAILED DESCRIPTION DERM
LOCATION DETAILED: LEFT AXILLARY VAULT
LOCATION DETAILED: LEFT POSTERIOR AXILLA

## 2023-05-10 ASSESSMENT — LOCATION SIMPLE DESCRIPTION DERM
LOCATION SIMPLE: LEFT POSTERIOR AXILLA
LOCATION SIMPLE: LEFT AXILLARY VAULT

## 2023-05-10 NOTE — PROCEDURE: PRESCRIPTION MEDICATION MANAGEMENT
Detail Level: Zone
Continue Regimen: doxycycline hyclate 100 mg capsule\\nHibiclens wash
Render In Strict Bullet Format?: No

## 2023-05-10 NOTE — PROCEDURE: ADDITIONAL NOTES
Detail Level: Simple
Additional Notes: Spoke about systemic treatment options as well as laser hair removal at this time patient reports that injections are keeping area stable
Patient Management Risk Assessment: Low
Render Risk Assessment In Note?: yes

## 2023-06-02 NOTE — PROCEDURE: MEDICATION COUNSELING
H&P UPDATE NOTE    Ondina Sampson returns to clinic today for scheduled Procedure. Patient was evaluated in the exam room prior to being brought back to the procedure room. There were no changes in symptoms or clinical condition since the prior visit. All appropriate documentation and studies were reviewed. Physical exam unchanged from the prior visit. After extensive discussion concerning benefits and risks of the procedure--not limited to bleeding, infection, nerve injury, headache, worsening of pain, medications adverse effects and allergic reaction--the patient approved and consented for the procedure. The patient denied history of allergies to relevant medications and denied current infections, anticoagulation therapy, possible pregnancy, abnormal control of blood pressure and blood sugar. The patient was able to repeat the possible side effects and risks with complete understanding. The aforementioned treatment plan was also discussed with the patient and any available family members, who indicated it's understanding, agreed with it and there were no barriers to communication.     Will proceed with Ganglion Impar     Rhofade Counseling: Rhofade is a topical medication which can decrease superficial blood flow where applied. Side effects are uncommon and include stinging, redness and allergic reactions.

## 2023-06-20 ENCOUNTER — APPOINTMENT (OUTPATIENT)
Dept: URBAN - METROPOLITAN AREA CLINIC 303 | Age: 33
Setting detail: DERMATOLOGY
End: 2023-06-30

## 2023-06-20 DIAGNOSIS — L29.8 OTHER PRURITUS: ICD-10-CM

## 2023-06-20 DIAGNOSIS — L73.2 HIDRADENITIS SUPPURATIVA: ICD-10-CM

## 2023-06-20 PROCEDURE — 99213 OFFICE O/P EST LOW 20 MIN: CPT | Mod: 25

## 2023-06-20 PROCEDURE — OTHER INTRALESIONAL KENALOG: OTHER

## 2023-06-20 PROCEDURE — 11900 INJECT SKIN LESIONS </W 7: CPT

## 2023-06-20 PROCEDURE — OTHER PRESCRIPTION: OTHER

## 2023-06-20 PROCEDURE — OTHER COUNSELING: OTHER

## 2023-06-20 RX ORDER — CLINDAMYCIN PHOSPHATE 10 MG/ML
SOLUTION TOPICAL
Qty: 1 | Refills: 2 | Status: ERX | COMMUNITY
Start: 2023-06-20

## 2023-06-20 RX ORDER — SPIRONOLACTONE 100 MG/1
TABLET, FILM COATED ORAL
Qty: 30 | Refills: 3 | Status: ERX | COMMUNITY
Start: 2023-06-20

## 2023-06-20 ASSESSMENT — LOCATION DETAILED DESCRIPTION DERM
LOCATION DETAILED: LEFT AXILLARY VAULT
LOCATION DETAILED: LEFT MEDIAL SUPERIOR CHEST
LOCATION DETAILED: LEFT ANTERIOR SHOULDER
LOCATION DETAILED: LEFT POSTERIOR AXILLA
LOCATION DETAILED: RIGHT ANTERIOR SHOULDER

## 2023-06-20 ASSESSMENT — LOCATION ZONE DERM
LOCATION ZONE: AXILLAE
LOCATION ZONE: ARM
LOCATION ZONE: TRUNK

## 2023-06-20 ASSESSMENT — LOCATION SIMPLE DESCRIPTION DERM
LOCATION SIMPLE: LEFT POSTERIOR AXILLA
LOCATION SIMPLE: LEFT SHOULDER
LOCATION SIMPLE: LEFT AXILLARY VAULT
LOCATION SIMPLE: CHEST
LOCATION SIMPLE: RIGHT SHOULDER

## 2024-01-08 NOTE — PROCEDURE: MEDICATION COUNSELING
08-Jan-2024 12:00 Rituxan Counseling:  I discussed with the patient the risks of Rituxan infusions. Side effects can include infusion reactions, severe drug rashes including mucocutaneous reactions, reactivation of latent hepatitis and other infections and rarely progressive multifocal leukoencephalopathy.  All of the patient's questions and concerns were addressed.

## 2024-02-17 NOTE — PROCEDURE: MEDICATION COUNSELING
[Rituxan Pregnancy And Lactation Text: This medication is Pregnancy Category C and it isn't know if it is safe during pregnancy. It is unknown if this medication is excreted in breast milk but similar antibodies are known to be excreted.

## 2024-02-27 NOTE — PROCEDURE: MEDICATION COUNSELING
Yes Eucrisa Counseling: Patient may experience a mild burning sensation during topical application. Eucrisa is not approved in children less than 2 years of age.

## 2024-03-19 ENCOUNTER — RX ONLY (RX ONLY)
Age: 34
End: 2024-03-19

## 2024-03-19 RX ORDER — SPIRONOLACTONE 100 MG/1
TABLET, FILM COATED ORAL
Qty: 30 | Refills: 0 | Status: ERX

## 2024-03-20 ENCOUNTER — RX ONLY (RX ONLY)
Age: 34
End: 2024-03-20

## 2024-03-20 RX ORDER — SPIRONOLACTONE 100 MG/1
TABLET, FILM COATED ORAL
Qty: 30 | Refills: 0 | Status: ERX

## 2024-05-26 NOTE — PROCEDURE: MEDICATION COUNSELING
Mirvaso Counseling: Mirvaso is a topical medication which can decrease superficial blood flow where applied. Side effects are uncommon and include stinging, redness and allergic reactions. <-- Click to add NO significant Past Surgical History

## 2025-07-09 ENCOUNTER — APPOINTMENT (OUTPATIENT)
Dept: URBAN - METROPOLITAN AREA CLINIC 305 | Age: 35
Setting detail: DERMATOLOGY
End: 2025-07-09

## 2025-07-09 DIAGNOSIS — L29.89 OTHER PRURITUS: ICD-10-CM

## 2025-07-09 DIAGNOSIS — L73.2 HIDRADENITIS SUPPURATIVA: ICD-10-CM

## 2025-07-09 DIAGNOSIS — L70.0 ACNE VULGARIS: ICD-10-CM

## 2025-07-09 PROCEDURE — 11900 INJECT SKIN LESIONS </W 7: CPT

## 2025-07-09 PROCEDURE — 99214 OFFICE O/P EST MOD 30 MIN: CPT | Mod: 25

## 2025-07-09 PROCEDURE — OTHER PRESCRIPTION: OTHER

## 2025-07-09 PROCEDURE — OTHER INTRALESIONAL KENALOG: OTHER

## 2025-07-09 PROCEDURE — OTHER PRESCRIPTION MEDICATION MANAGEMENT: OTHER

## 2025-07-09 PROCEDURE — OTHER COUNSELING: OTHER

## 2025-07-09 PROCEDURE — OTHER ADDITIONAL NOTES: OTHER

## 2025-07-09 RX ORDER — SPIRONOLACTONE 100 MG/1
TABLET, FILM COATED ORAL
Qty: 30 | Refills: 3 | Status: ERX

## 2025-07-09 RX ORDER — TRETIONIN 0.25 MG/G
CREAM TOPICAL
Qty: 45 | Refills: 0 | Status: ERX | COMMUNITY
Start: 2025-07-09

## 2025-07-09 RX ORDER — CLINDAMYCIN PHOSPHATE 10 MG/ML
SOLUTION TOPICAL
Qty: 1 | Refills: 2 | Status: ERX

## 2025-07-09 ASSESSMENT — LOCATION DETAILED DESCRIPTION DERM
LOCATION DETAILED: RIGHT INFERIOR CENTRAL MALAR CHEEK
LOCATION DETAILED: LEFT INFERIOR CENTRAL MALAR CHEEK
LOCATION DETAILED: RIGHT AXILLARY VAULT

## 2025-07-09 ASSESSMENT — LOCATION ZONE DERM
LOCATION ZONE: FACE
LOCATION ZONE: AXILLAE

## 2025-07-09 ASSESSMENT — LOCATION SIMPLE DESCRIPTION DERM
LOCATION SIMPLE: LEFT CHEEK
LOCATION SIMPLE: RIGHT CHEEK
LOCATION SIMPLE: RIGHT AXILLARY VAULT
